# Patient Record
Sex: FEMALE
[De-identification: names, ages, dates, MRNs, and addresses within clinical notes are randomized per-mention and may not be internally consistent; named-entity substitution may affect disease eponyms.]

---

## 2020-05-04 ENCOUNTER — NURSE TRIAGE (OUTPATIENT)
Dept: OTHER | Facility: CLINIC | Age: 70
End: 2020-05-04

## 2021-07-07 ENCOUNTER — NURSE TRIAGE (OUTPATIENT)
Dept: OTHER | Facility: CLINIC | Age: 71
End: 2021-07-07

## 2021-07-07 NOTE — TELEPHONE ENCOUNTER
Reason for Disposition   Normal local reaction to bee, wasp, or yellow jacket sting    Answer Assessment - Initial Assessment Questions  1. TYPE: \"What type of sting was it? \" (bee, yellow jacket, etc.)       Wasp     2. ONSET: \"When did it occur? \"     Occurred yesterday    3. LOCATION: \"Where is the sting located? \"  \"How many stings? \"  On tops of both feet    4. SWELLING SIZE: \"How big is the swelling? \" (e.g., inches or cm)   feet are swollen today, is able to put shoes on. Swelling is ankle to top of feet  Skin looks tight    5. REDNESS: \"Is the area red or pink? \" If so, ask \"What size is area of redness? \" (e.g., inches or cm). \"When did the redness start? \"     Redness to top of feet to toes    6. PAIN: \"Is there any pain? \" If so, ask: \"How bad is it? \"  (Scale 1-10; or mild, moderate, severe)      No pain    7. ITCHING: \"Is there any itching? \" If so, ask: \"How bad is it? \"   Very itchy    8. RESPIRATORY DISTRESS: \"Describe your breathing. \"      No breathing difficulty    9. PRIOR REACTIONS: \"Have you had any severe allergic reactions to stings in the past?\" if yes, ask: \"What happened? \"      No allergies    10. OTHER SYMPTOMS: \"Do you have any other symptoms? \" (e.g., abdominal pain, face or tongue swelling, new rash elsewhere, vomiting)      No other symptoms    11. PREGNANCY: \"Is there any chance you are pregnant? \" \"When was your last menstrual period? \"    Protocols used: BEE OR YELLOW JACKET STING-ADULT-[place]  Brief description of triage: Pt stung by wasp yesterday on top of both feet. Took benadryl and tylenol. Today feet are swollen on top, still able to get shoes on. Feet are red on top. No pain. No breathing concerns, no vomiting. No other rash. Triage indicates for patient to home care    Care advice provided, patient verbalizes understanding; denies any other questions or concerns; instructed to call back for any new or worsening symptoms.     This triage is a result of a call to [address] [place] [address] Nurse Line. Please do not respond to the triage nurse through this encounter. Any subsequent communication should be directly with the patient.